# Patient Record
Sex: MALE | Race: WHITE | NOT HISPANIC OR LATINO | URBAN - METROPOLITAN AREA
[De-identification: names, ages, dates, MRNs, and addresses within clinical notes are randomized per-mention and may not be internally consistent; named-entity substitution may affect disease eponyms.]

---

## 2017-01-16 ENCOUNTER — APPOINTMENT (RX ONLY)
Dept: URBAN - METROPOLITAN AREA CLINIC 22 | Facility: CLINIC | Age: 52
Setting detail: DERMATOLOGY
End: 2017-01-16

## 2017-01-16 DIAGNOSIS — Z47.89 ENCOUNTER FOR OTHER ORTHOPEDIC AFTERCARE: ICD-10-CM

## 2017-01-16 PROCEDURE — ? WORKER'S COMPENSATION EVALUATION

## 2017-01-16 PROCEDURE — 99212 OFFICE O/P EST SF 10 MIN: CPT

## 2017-01-16 PROCEDURE — ? COUNSELING - POST-OP CHECK, REVISION OF HAND AMPUTATION

## 2017-01-16 ASSESSMENT — LOCATION ZONE DERM: LOCATION ZONE: FINGER

## 2017-01-16 ASSESSMENT — LOCATION SIMPLE DESCRIPTION DERM: LOCATION SIMPLE: RIGHT RING FINGER DISTAL PHALANX

## 2017-01-16 ASSESSMENT — LOCATION DETAILED DESCRIPTION DERM: LOCATION DETAILED: RIGHT RING FINGER DISTAL PHALANX

## 2017-01-16 NOTE — PROCEDURE: WORKER'S COMPENSATION EVALUATION
Percentage Of Disability Unrelated To Employment: 0
Objective And Relevant Medical Findings?: Yes
Do The Objective And Relevant Medical Findings Represent An Exacerbation Or Aggravation Of Pre-Existing Condition?: No
Detail Level: Simple
Injury Is: work related
Other Mmi: Patient has reached mmi
Percentage Of Disability Related To Employment: 100